# Patient Record
Sex: FEMALE | Race: ASIAN
[De-identification: names, ages, dates, MRNs, and addresses within clinical notes are randomized per-mention and may not be internally consistent; named-entity substitution may affect disease eponyms.]

---

## 2017-04-26 ENCOUNTER — HOSPITAL ENCOUNTER (OUTPATIENT)
Dept: HOSPITAL 74 - FMAMMOTONE | Age: 42
Discharge: HOME | End: 2017-04-26
Payer: COMMERCIAL

## 2017-04-26 DIAGNOSIS — R92.1: ICD-10-CM

## 2017-04-26 DIAGNOSIS — N60.31: Primary | ICD-10-CM

## 2017-04-26 DIAGNOSIS — N60.81: ICD-10-CM

## 2017-04-26 DIAGNOSIS — N64.89: ICD-10-CM

## 2017-04-26 PROCEDURE — A4648 IMPLANTABLE TISSUE MARKER: HCPCS

## 2017-04-26 PROCEDURE — 0HBT3ZX EXCISION OF RIGHT BREAST, PERCUTANEOUS APPROACH, DIAGNOSTIC: ICD-10-PCS

## 2017-04-27 NOTE — PATH
Surgical Pathology Report



Patient Name:  NANCY TIDWELL

Accession #:  

Med. Rec. #:  [id number]                                                        

   /Age/Gender:  1975 (Age: 41) / F

Account:  [id number]                                                          

             Location: [facility]

Taken:  2017

Received:  2017

Reported:  2017

Physicians:  MEHRAN Davis MD

  



Specimen(s) Received

A: RIGHT BREAST SPECIMEN WITH CALCIFICATIONS 

B: RIGHT BREAST SPECIMEN WITHOUT CALCIFICATIONS 





Clinical History

Nonpalpable lesion, microcalcification, suspicious







Final Diagnosis

A. RIGHT BREAST, WITH CALCIFICATION, STEREOTACTIC NEEDLE CORE BIOPSY:  

BENIGN BREAST TISSUE WITH FIBROCYSTIC CHANGES INCLUDING COLUMNAR CELL CHANGE,

STROMAL FIBROSIS, AND DUCTAL DILATATION.

MICROCALCIFICATIONS ARE IDENTIFIED WITHIN BENIGN DUCTAL STRUCTURES.



B. RIGHT BREAST, WITHOUT CALCIFICATION, STEREOTACTIC NEEDLE CORE BIOPSY:  

BENIGN BREAST TISSUE WITH FIBROCYSTIC CHANGES INCLUDING USUAL DUCTAL HYPERPLASIA

(UDH), STROMAL FIBROSIS, AND DUCTAL DILATATION.







***Electronically Signed***

Aston Mueller M.D.





Gross Description

A.  Received in formalin, labeled "right breast with calcifications," are 5

tan-yellow, cylindrical portions of fibroadipose tissue ranging from 1.1-2.8 cm.

in length and averaging 0.3 cm. in diameter. The specimen is submitted in toto

in one cassette. 



B. Received in formalin labeled "right breast without calcifications," is a 1.8

cm in length x 0.3 cm in diameter tan-yellow, cylindrical portion of

fibroadipose tissue. The specimen is submitted in toto in one cassette.



Time to formalin fixation: 5 minutes

Total formalin fixation time: Approximately 9 hours.

/2017



[facility]

## 2019-02-05 ENCOUNTER — APPOINTMENT (OUTPATIENT)
Dept: INTERNAL MEDICINE | Facility: CLINIC | Age: 44
End: 2019-02-05
Payer: COMMERCIAL

## 2019-02-05 VITALS
DIASTOLIC BLOOD PRESSURE: 60 MMHG | SYSTOLIC BLOOD PRESSURE: 80 MMHG | HEART RATE: 72 BPM | BODY MASS INDEX: 20.38 KG/M2 | WEIGHT: 115 LBS | OXYGEN SATURATION: 98 % | HEIGHT: 63 IN

## 2019-02-05 PROCEDURE — 99201 OFFICE OUTPATIENT NEW 10 MINUTES: CPT

## 2019-02-05 PROCEDURE — 99203 OFFICE O/P NEW LOW 30 MIN: CPT

## 2019-02-05 NOTE — HISTORY OF PRESENT ILLNESS
[FreeTextEntry1] : new patient needs primary care  [de-identified] : She presents to the office to meet her new PCP. She is on thyroid medication. However she has not taken it for 2 weeks because she has no more medication. She also would like to get an ultrasound for her thyroid because she has a 7 mm thyroid nodule. Patient will schedule an annual at a different time.

## 2019-02-05 NOTE — HEALTH RISK ASSESSMENT
[No falls in past year] : Patient reported no falls in the past year [0] : 2) Feeling down, depressed, or hopeless: Not at all (0) [HIV test declined] : HIV test declined [Hepatitis C test declined] : Hepatitis C test declined [] : No [MammogramDate] : 2018 [PapSmearDate] : 2017 [BoneDensityDate] : never [ColonoscopyDate] : few years ago

## 2019-02-15 ENCOUNTER — APPOINTMENT (OUTPATIENT)
Dept: INTERNAL MEDICINE | Facility: CLINIC | Age: 44
End: 2019-02-15
Payer: COMMERCIAL

## 2019-02-15 ENCOUNTER — RESULT REVIEW (OUTPATIENT)
Age: 44
End: 2019-02-15

## 2019-02-15 ENCOUNTER — NON-APPOINTMENT (OUTPATIENT)
Age: 44
End: 2019-02-15

## 2019-02-15 VITALS
HEART RATE: 78 BPM | SYSTOLIC BLOOD PRESSURE: 100 MMHG | TEMPERATURE: 97.8 F | OXYGEN SATURATION: 99 % | BODY MASS INDEX: 20.55 KG/M2 | DIASTOLIC BLOOD PRESSURE: 60 MMHG | WEIGHT: 116 LBS | HEIGHT: 63 IN

## 2019-02-15 DIAGNOSIS — Z00.00 ENCOUNTER FOR GENERAL ADULT MEDICAL EXAMINATION W/OUT ABNORMAL FINDINGS: ICD-10-CM

## 2019-02-15 DIAGNOSIS — E03.9 HYPOTHYROIDISM, UNSPECIFIED: ICD-10-CM

## 2019-02-15 PROCEDURE — 36415 COLL VENOUS BLD VENIPUNCTURE: CPT

## 2019-02-15 PROCEDURE — 99396 PREV VISIT EST AGE 40-64: CPT | Mod: 25

## 2019-02-15 PROCEDURE — 93000 ELECTROCARDIOGRAM COMPLETE: CPT

## 2019-02-19 NOTE — HISTORY OF PRESENT ILLNESS
[FreeTextEntry1] : physical  [de-identified] : Patient presents to the office for an annual exam. She currently feels fine. She will be getting an ultrasound for her thyroid nodule. She is also due for a mammogram.

## 2019-02-19 NOTE — HEALTH RISK ASSESSMENT
[Two or more falls in past year] : Patient reported two or more falls in the past year [0] : 2) Feeling down, depressed, or hopeless: Not at all (0) [HIV test declined] : HIV test declined [] : No [MammogramDate] : 2018 [PapSmearDate] : 2018 [BoneDensityDate] : never [ColonoscopyDate] : never

## 2019-02-20 LAB
25(OH)D3 SERPL-MCNC: 24.8 NG/ML
ALBUMIN SERPL ELPH-MCNC: 4.3 G/DL
ALP BLD-CCNC: 44 U/L
ALT SERPL-CCNC: 13 U/L
ANION GAP SERPL CALC-SCNC: 10 MMOL/L
AST SERPL-CCNC: 18 U/L
BASOPHILS # BLD AUTO: 0.03 K/UL
BASOPHILS NFR BLD AUTO: 0.5 %
BILIRUB SERPL-MCNC: 0.3 MG/DL
BUN SERPL-MCNC: 13 MG/DL
CALCIUM SERPL-MCNC: 9.2 MG/DL
CHLORIDE SERPL-SCNC: 105 MMOL/L
CHOLEST SERPL-MCNC: 170 MG/DL
CHOLEST/HDLC SERPL: 2.1 RATIO
CO2 SERPL-SCNC: 26 MMOL/L
CREAT SERPL-MCNC: 0.7 MG/DL
EOSINOPHIL # BLD AUTO: 0.26 K/UL
EOSINOPHIL NFR BLD AUTO: 3.9 %
GLUCOSE SERPL-MCNC: 97 MG/DL
HBA1C MFR BLD HPLC: 5.2 %
HCT VFR BLD CALC: 43.3 %
HDLC SERPL-MCNC: 81 MG/DL
HGB BLD-MCNC: 13.9 G/DL
IMM GRANULOCYTES NFR BLD AUTO: 0.2 %
LDLC SERPL CALC-MCNC: 77 MG/DL
LYMPHOCYTES # BLD AUTO: 1.29 K/UL
LYMPHOCYTES NFR BLD AUTO: 19.6 %
MAN DIFF?: NORMAL
MCHC RBC-ENTMCNC: 31.6 PG
MCHC RBC-ENTMCNC: 32.1 GM/DL
MCV RBC AUTO: 98.4 FL
MONOCYTES # BLD AUTO: 0.43 K/UL
MONOCYTES NFR BLD AUTO: 6.5 %
NEUTROPHILS # BLD AUTO: 4.57 K/UL
NEUTROPHILS NFR BLD AUTO: 69.3 %
PLATELET # BLD AUTO: 267 K/UL
POTASSIUM SERPL-SCNC: 4.5 MMOL/L
PROT SERPL-MCNC: 6.7 G/DL
RBC # BLD: 4.4 M/UL
RBC # FLD: 13.4 %
SODIUM SERPL-SCNC: 141 MMOL/L
T4 SERPL-MCNC: 7.5 UG/DL
TRIGL SERPL-MCNC: 62 MG/DL
TSH SERPL-ACNC: 3.05 UIU/ML
VIT B12 SERPL-MCNC: 545 PG/ML
WBC # FLD AUTO: 6.59 K/UL

## 2019-02-21 ENCOUNTER — APPOINTMENT (OUTPATIENT)
Dept: INTERNAL MEDICINE | Facility: CLINIC | Age: 44
End: 2019-02-21

## 2020-02-24 ENCOUNTER — RX RENEWAL (OUTPATIENT)
Age: 45
End: 2020-02-24

## 2020-02-24 RX ORDER — LEVOTHYROXINE SODIUM 0.05 MG/1
50 TABLET ORAL DAILY
Qty: 90 | Refills: 0 | Status: ACTIVE | COMMUNITY
Start: 2019-02-05 | End: 1900-01-01

## 2020-02-25 ENCOUNTER — TRANSCRIPTION ENCOUNTER (OUTPATIENT)
Age: 45
End: 2020-02-25

## 2021-03-25 ENCOUNTER — TRANSCRIPTION ENCOUNTER (OUTPATIENT)
Age: 46
End: 2021-03-25

## 2022-01-19 NOTE — PHYSICAL EXAM
Alert-The patient is alert, awake and responds to voice. The patient is oriented to time, place, and person. The triage nurse is able to obtain subjective information. [No Acute Distress] : no acute distress [Normal Sclera/Conjunctiva] : normal sclera/conjunctiva [Normal Outer Ear/Nose] : the outer ears and nose were normal in appearance [No Respiratory Distress] : no respiratory distress  [Normal Rate] : normal rate